# Patient Record
Sex: MALE | URBAN - METROPOLITAN AREA
[De-identification: names, ages, dates, MRNs, and addresses within clinical notes are randomized per-mention and may not be internally consistent; named-entity substitution may affect disease eponyms.]

---

## 2019-10-09 ENCOUNTER — IMPORTED ENCOUNTER (OUTPATIENT)
Dept: URBAN - METROPOLITAN AREA CLINIC 1 | Facility: CLINIC | Age: 49
End: 2019-10-09

## 2019-10-09 PROBLEM — H16.001: Noted: 2019-10-09

## 2019-10-09 PROCEDURE — 92002 INTRM OPH EXAM NEW PATIENT: CPT

## 2019-10-09 NOTE — PATIENT DISCUSSION
1.  Corneal ulcer OD: Patient is not a contact lens wearer. Return immediately if ulcer larger or symptoms worsen. Begin Besivance Q2hr OD and E-mycin ointment QHS OD (Coupon Given & erx'd). Return for an appointment on Friday Morning 10/11/19 for a 10 / K check with Dr. Anne Etienne.

## 2019-10-11 ENCOUNTER — IMPORTED ENCOUNTER (OUTPATIENT)
Dept: URBAN - METROPOLITAN AREA CLINIC 1 | Facility: CLINIC | Age: 49
End: 2019-10-11

## 2019-10-11 PROBLEM — H16.001: Noted: 2019-10-11

## 2019-10-11 PROCEDURE — 92012 INTRM OPH EXAM EST PATIENT: CPT

## 2019-10-11 NOTE — PATIENT DISCUSSION
1.  Corneal Ulcer OD -- 90% Resolved. Patient is not a CTL wearer. Return immediately if ulcer becomes larger or symptoms worsen. Continue Besivance Q2H OD until Monday 10/14/19 then decrease QID OD until seen back. Continue E-mycin Morena QHS OD. Recommend increase the frequent use of OTC PF AT's QID-Q1H OD Routinely (Sample of Systane Ultra PF Given). Return for an appointment on Thursday 10/17/19 for a 30 (K Check OD) with Dr. Angel Evans.

## 2019-10-17 ENCOUNTER — IMPORTED ENCOUNTER (OUTPATIENT)
Dept: URBAN - METROPOLITAN AREA CLINIC 1 | Facility: CLINIC | Age: 49
End: 2019-10-17

## 2019-10-17 PROBLEM — H16.001: Noted: 2019-10-17

## 2019-10-17 PROBLEM — H17.821: Noted: 2019-10-17

## 2019-10-17 PROCEDURE — 92014 COMPRE OPH EXAM EST PT 1/>: CPT

## 2019-10-23 ENCOUNTER — IMPORTED ENCOUNTER (OUTPATIENT)
Dept: URBAN - METROPOLITAN AREA CLINIC 1 | Facility: CLINIC | Age: 49
End: 2019-10-23

## 2022-04-03 ASSESSMENT — VISUAL ACUITY
OS_CC: 20/20
OS_CC: J1+-1
OD_CC: 20/20
OD_CC: J1+-1
OS_CC: 20/20
OS_CC: 20/20
OD_CC: 20/20
OS_CC: 20/20
OD_CC: 20/20
OD_CC: 20/20

## 2022-04-03 ASSESSMENT — TONOMETRY
OS_IOP_MMHG: 11
OD_IOP_MMHG: 11
OS_IOP_MMHG: 17

## 2024-02-19 NOTE — PATIENT DISCUSSION
1.  H/o Recent Peripheral Corneal Ulcer OD -- Resolved. Return immediately if ulcer returns. Patient is not a CTL wearer. Ok to continue w/o Besivance OD. Ok to continue w/o E-mycin Morena OD. Recommend continue the frequent use of OTC PF AT's QID OD Routinely. 2. Peripheral Corneal Scar OD (secondary to recent K Ulcer) -- Observe. Recommend continue the frequent use of OTC PF AT's QID OD Routinely. Return for the next available Refraction ONLY appointment with Dr. Lyn Reina.
Peripheral Corneal Scar OD -
Male